# Patient Record
Sex: FEMALE | Race: WHITE | Employment: FULL TIME | URBAN - METROPOLITAN AREA
[De-identification: names, ages, dates, MRNs, and addresses within clinical notes are randomized per-mention and may not be internally consistent; named-entity substitution may affect disease eponyms.]

---

## 2017-10-25 PROBLEM — Q62.5 DUPLICATED RIGHT RENAL COLLECTING SYSTEM: Status: ACTIVE | Noted: 2017-10-25

## 2017-10-25 PROBLEM — N20.0 KIDNEY STONES: Status: ACTIVE | Noted: 2017-10-25

## 2017-10-25 PROBLEM — N20.0 RIGHT KIDNEY STONE: Status: ACTIVE | Noted: 2017-10-25

## 2017-12-30 PROCEDURE — 82436 ASSAY OF URINE CHLORIDE: CPT | Performed by: UROLOGY

## 2017-12-30 PROCEDURE — 82507 ASSAY OF CITRATE: CPT | Performed by: UROLOGY

## 2017-12-30 PROCEDURE — 84392 ASSAY OF URINE SULFATE: CPT | Performed by: UROLOGY

## 2017-12-30 PROCEDURE — 82340 ASSAY OF CALCIUM IN URINE: CPT | Performed by: UROLOGY

## 2017-12-30 PROCEDURE — 83945 ASSAY OF OXALATE: CPT | Performed by: UROLOGY

## 2018-11-19 PROCEDURE — 82436 ASSAY OF URINE CHLORIDE: CPT | Performed by: OBSTETRICS & GYNECOLOGY

## 2018-11-19 PROCEDURE — 82340 ASSAY OF CALCIUM IN URINE: CPT | Performed by: OBSTETRICS & GYNECOLOGY

## 2018-11-19 PROCEDURE — 82507 ASSAY OF CITRATE: CPT | Performed by: OBSTETRICS & GYNECOLOGY

## 2018-11-19 PROCEDURE — 84392 ASSAY OF URINE SULFATE: CPT | Performed by: OBSTETRICS & GYNECOLOGY

## 2018-11-19 PROCEDURE — 83945 ASSAY OF OXALATE: CPT | Performed by: OBSTETRICS & GYNECOLOGY

## 2019-12-07 ENCOUNTER — HOSPITAL ENCOUNTER (EMERGENCY)
Facility: HOSPITAL | Age: 64
Discharge: LEFT BEFORE DISCHARGE | End: 2019-12-07
Attending: EMERGENCY MEDICINE
Payer: COMMERCIAL

## 2019-12-07 ENCOUNTER — HOSPITAL ENCOUNTER (EMERGENCY)
Facility: HOSPITAL | Age: 64
Discharge: LEFT BEFORE DISCHARGE | End: 2019-12-07
Payer: COMMERCIAL

## 2019-12-07 NOTE — ED NOTES
Entered room. Refused to change. States does not want to be examined and have vital signs taken. Reports has had a bad experience in the past.   will see her own physician and will take pain medications in the meantime.

## 2020-10-21 ENCOUNTER — ORDER TRANSCRIPTION (OUTPATIENT)
Dept: ADMINISTRATIVE | Facility: HOSPITAL | Age: 65
End: 2020-10-21

## 2020-10-21 DIAGNOSIS — Z11.59 ENCOUNTER FOR SCREENING FOR OTHER VIRAL DISEASES: ICD-10-CM

## 2020-10-21 DIAGNOSIS — Z01.818 PREOP EXAMINATION: Primary | ICD-10-CM

## 2020-10-27 ENCOUNTER — LAB ENCOUNTER (OUTPATIENT)
Dept: LAB | Facility: HOSPITAL | Age: 65
End: 2020-10-27
Attending: OTOLARYNGOLOGY
Payer: COMMERCIAL

## 2020-10-27 DIAGNOSIS — Z01.812 PRE-PROCEDURE LAB EXAM: Primary | ICD-10-CM

## 2020-10-30 ENCOUNTER — HOSPITAL ENCOUNTER (OUTPATIENT)
Dept: GENERAL RADIOLOGY | Facility: HOSPITAL | Age: 65
Discharge: HOME | End: 2020-10-30
Attending: OTOLARYNGOLOGY
Payer: COMMERCIAL

## 2020-10-30 DIAGNOSIS — R07.0 THROAT DISCOMFORT: ICD-10-CM

## 2020-10-30 PROCEDURE — 74221 X-RAY XM ESOPHAGUS 2CNTRST: CPT | Performed by: OTOLARYNGOLOGY

## 2021-03-05 ENCOUNTER — ORDER TRANSCRIPTION (OUTPATIENT)
Dept: PHYSICAL THERAPY | Facility: HOSPITAL | Age: 66
End: 2021-03-05

## 2021-03-05 DIAGNOSIS — R13.10 DYSPHAGIA, UNSPECIFIED TYPE: Primary | ICD-10-CM

## 2021-04-02 ENCOUNTER — TELEPHONE (OUTPATIENT)
Dept: PHYSICAL THERAPY | Facility: HOSPITAL | Age: 66
End: 2021-04-02

## 2021-04-05 ENCOUNTER — OFFICE VISIT (OUTPATIENT)
Dept: SPEECH THERAPY | Facility: HOSPITAL | Age: 66
End: 2021-04-05
Attending: INTERNAL MEDICINE
Payer: COMMERCIAL

## 2021-04-05 DIAGNOSIS — R13.10 DYSPHAGIA, UNSPECIFIED TYPE: ICD-10-CM

## 2021-04-05 PROCEDURE — 92610 EVALUATE SWALLOWING FUNCTION: CPT

## 2021-04-05 NOTE — PROGRESS NOTES
ADULT SWALLOWING EVALUATION:   Referring Physician: Dr. Reza Ventura  Diagnosis: Dysphagia, unspecified type (R13.10) Date of Service: 4/5/2021   Clinical swallow evaluation completed per MD order.       PATIENT SUMMARY   Ian Ruggiero is a 73year old fema 0  Meth-Hyo-M Bl-Na Phos-Ph Sal (URIBEL) 118 MG Oral Cap, TK ONE C PO  TID, Disp: , Rfl: 0  tamsulosin HCl 0.4 MG Oral Cap, TK ONE C PO  QHS, Disp: , Rfl: 0  LORazepam 0.5 MG Oral Tab, TK 1 T PO BID PRN, Disp: , Rfl: 0  FLUoxetine HCl 10 MG Oral Cap, TK 1 to suspected pharyngeal residue. Clinical s/s of penetration/aspiration (ie: wet vocal quality, throat-clear/cough) were not observed under parameters of assessment.   Patient would benefit from skilled dysphagia therapy to address education of swallow mec in 6 visits)   Long Term:   LTG 1: Patient will report improved swallow function for safest/least restrictive means of nutrition/hydration (6 months).                     Short Term:   STG 1: Patient will verbalize understanding of aspiration precautions an

## 2021-04-16 ENCOUNTER — OFFICE VISIT (OUTPATIENT)
Dept: SPEECH THERAPY | Facility: HOSPITAL | Age: 66
End: 2021-04-16
Attending: INTERNAL MEDICINE
Payer: COMMERCIAL

## 2021-04-16 DIAGNOSIS — R13.10 DYSPHAGIA, UNSPECIFIED TYPE: ICD-10-CM

## 2021-04-16 PROCEDURE — 92526 ORAL FUNCTION THERAPY: CPT

## 2021-04-16 NOTE — PROGRESS NOTES
Treatment #2 (PPO; POC thru 7/4/21)   Treatment Time: 60 minutes  Precautions: aspiraiton     Charges: 1 billed ([id number])  Pain: 0/10      Diagnosis: Dysphagia, unspecified type (R13.10)            Subjective: Patient arrived to session on time.   She partici breathing.  HEP assigned to complete swallow exercises and effortful swallows during all consumption.

## 2021-04-20 ENCOUNTER — TELEPHONE (OUTPATIENT)
Dept: PHYSICAL THERAPY | Facility: HOSPITAL | Age: 66
End: 2021-04-20

## 2021-04-23 ENCOUNTER — APPOINTMENT (OUTPATIENT)
Dept: SPEECH THERAPY | Facility: HOSPITAL | Age: 66
End: 2021-04-23
Attending: INTERNAL MEDICINE
Payer: COMMERCIAL

## 2021-04-30 ENCOUNTER — APPOINTMENT (OUTPATIENT)
Dept: SPEECH THERAPY | Facility: HOSPITAL | Age: 66
End: 2021-04-30
Attending: INTERNAL MEDICINE
Payer: COMMERCIAL

## 2021-05-07 ENCOUNTER — APPOINTMENT (OUTPATIENT)
Dept: SPEECH THERAPY | Facility: HOSPITAL | Age: 66
End: 2021-05-07
Attending: INTERNAL MEDICINE
Payer: COMMERCIAL

## 2021-05-14 ENCOUNTER — APPOINTMENT (OUTPATIENT)
Dept: SPEECH THERAPY | Facility: HOSPITAL | Age: 66
End: 2021-05-14
Attending: INTERNAL MEDICINE
Payer: COMMERCIAL

## 2022-02-24 ENCOUNTER — OFFICE VISIT (OUTPATIENT)
Dept: OBGYN CLINIC | Facility: CLINIC | Age: 67
End: 2022-02-24
Payer: COMMERCIAL

## 2022-02-24 VITALS
HEART RATE: 58 BPM | DIASTOLIC BLOOD PRESSURE: 72 MMHG | WEIGHT: 119.25 LBS | SYSTOLIC BLOOD PRESSURE: 104 MMHG | BODY MASS INDEX: 23.41 KG/M2 | HEIGHT: 60 IN

## 2022-02-24 DIAGNOSIS — Z12.39 BREAST CANCER SCREENING OTHER THAN MAMMOGRAM: ICD-10-CM

## 2022-02-24 DIAGNOSIS — R92.2 DENSE BREAST TISSUE: ICD-10-CM

## 2022-02-24 DIAGNOSIS — Z12.31 ENCOUNTER FOR SCREENING MAMMOGRAM FOR BREAST CANCER: ICD-10-CM

## 2022-02-24 DIAGNOSIS — Z01.419 ENCOUNTER FOR WELL WOMAN EXAM WITH ROUTINE GYNECOLOGICAL EXAM: Primary | ICD-10-CM

## 2022-02-24 DIAGNOSIS — Z12.4 SCREENING FOR CERVICAL CANCER: ICD-10-CM

## 2022-02-24 PROCEDURE — 88175 CYTOPATH C/V AUTO FLUID REDO: CPT | Performed by: OBSTETRICS & GYNECOLOGY

## 2022-02-24 PROCEDURE — 3074F SYST BP LT 130 MM HG: CPT | Performed by: OBSTETRICS & GYNECOLOGY

## 2022-02-24 PROCEDURE — 3008F BODY MASS INDEX DOCD: CPT | Performed by: OBSTETRICS & GYNECOLOGY

## 2022-02-24 PROCEDURE — 99387 INIT PM E/M NEW PAT 65+ YRS: CPT | Performed by: OBSTETRICS & GYNECOLOGY

## 2022-02-24 PROCEDURE — 3078F DIAST BP <80 MM HG: CPT | Performed by: OBSTETRICS & GYNECOLOGY

## 2022-02-24 PROCEDURE — 87624 HPV HI-RISK TYP POOLED RSLT: CPT | Performed by: OBSTETRICS & GYNECOLOGY

## 2022-02-24 RX ORDER — BIOTIN 10000 MCG
1 CAPSULE ORAL DAILY
COMMUNITY

## 2022-02-25 LAB — HPV I/H RISK 1 DNA SPEC QL NAA+PROBE: NEGATIVE

## 2022-03-08 ENCOUNTER — TELEPHONE (OUTPATIENT)
Dept: OBGYN CLINIC | Facility: CLINIC | Age: 67
End: 2022-03-08

## 2022-03-08 NOTE — TELEPHONE ENCOUNTER
Patient advised a medical review stating need for exam from physician is needed to be sent to [facility] for approval

## 2022-03-09 NOTE — TELEPHONE ENCOUNTER
Contacted patient. She is calling regarding authorization needed for MBI. She has had this ordered as an add'l screening. Advised patient because this is a new test, it will not be authorized by insurance. Recommended that she complete whole breast ultrasound as this is covered by insurance and also considered an appropriate add'l screening. Patient states understanding and agrees with plan.

## 2022-03-17 ENCOUNTER — TELEPHONE (OUTPATIENT)
Dept: OBGYN CLINIC | Facility: CLINIC | Age: 67
End: 2022-03-17

## 2022-03-17 NOTE — TELEPHONE ENCOUNTER
Needs ultrasound and mammogram orders put in for both breasts and faxed to [facility].  See TE Below     Thank you

## 2022-03-17 NOTE — TELEPHONE ENCOUNTER
Left message on voicemail for patient to return phone call  Need to verify the order they are looking for   Detailed message left notifying patient to call back to verify the test she is having done

## 2022-03-18 NOTE — TELEPHONE ENCOUNTER
Received fax from [facility] asking to update diagnosis code for the breast ultrasound to Z12.39 and R92.2. Diagnosis code updated and faxed to [facility].

## 2022-04-18 ENCOUNTER — TELEPHONE (OUTPATIENT)
Dept: OBGYN CLINIC | Facility: CLINIC | Age: 67
End: 2022-04-18

## 2022-04-18 NOTE — TELEPHONE ENCOUNTER
[facility] Results   Placed in Dr. Amado jacobson in [facility] AT [facility]

## 2022-04-19 NOTE — TELEPHONE ENCOUNTER
Records reviewed:    Mammogram and whole breast ultrasound 4/18/22:  BI-RADS 2: benign findings. Heterogenously dense breast tissue. Repeat screening in 1 year.     Francisco Isaacs MD  [facility]  4/19/2022 12:40 PM

## 2022-07-28 ENCOUNTER — HOSPITAL ENCOUNTER (OUTPATIENT)
Dept: MRI IMAGING | Facility: HOSPITAL | Age: 67
Discharge: HOME | End: 2022-07-28
Payer: COMMERCIAL

## 2022-07-28 DIAGNOSIS — H90.42 SENSORINEURAL HEARING LOSS, UNILATERAL, LEFT EAR, WITH UNRESTRICTED HEARING ON THE CONTRALATERAL SIDE: ICD-10-CM

## 2022-07-28 PROCEDURE — A9575 INJ GADOTERATE MEGLUMI 0.1ML: HCPCS

## 2022-07-28 PROCEDURE — 70553 MRI BRAIN STEM W/O & W/DYE: CPT

## 2023-02-07 ENCOUNTER — TELEPHONE (OUTPATIENT)
Dept: OBGYN CLINIC | Facility: CLINIC | Age: 68
End: 2023-02-07

## 2023-02-07 NOTE — TELEPHONE ENCOUNTER
Pt calling states she needs vaginal cream for possible intercourse. Says she called yesterday but no documentation sent back. Pt upset about that.

## 2023-02-07 NOTE — TELEPHONE ENCOUNTER
GetFresh message sent. Contacted patient. Provided information and questions answered. Patient states understanding.

## 2023-02-07 NOTE — TELEPHONE ENCOUNTER
Patient has not been sexually active in about 4 years. She has a boyfriend now and may become sexually active. She has some day to day vaginal dryness and is concerned about having intercourse. She wants to know what we recommend for lubrication during intercourse.

## 2023-02-20 ENCOUNTER — TELEPHONE (OUTPATIENT)
Dept: OBGYN CLINIC | Facility: CLINIC | Age: 68
End: 2023-02-20

## 2023-02-20 NOTE — TELEPHONE ENCOUNTER
Patient has burning sensation on vulva. No vaginal discharge or itching. Patient has this after activity with current partner. Advised it sounds like irritation more than an infection. Advised an appointment would be needed for eval of the problem. Patient verbalized understanding and agrees to plan. Will be seen Th[facility]ie. In [place], by Dr. Tera Ferguson.

## 2023-03-15 ENCOUNTER — OFFICE VISIT (OUTPATIENT)
Dept: OBGYN CLINIC | Facility: CLINIC | Age: 68
End: 2023-03-15
Payer: COMMERCIAL

## 2023-03-15 ENCOUNTER — TELEPHONE (OUTPATIENT)
Dept: OBGYN CLINIC | Facility: CLINIC | Age: 68
End: 2023-03-15

## 2023-03-15 VITALS
HEIGHT: 60 IN | DIASTOLIC BLOOD PRESSURE: 60 MMHG | BODY MASS INDEX: 22.06 KG/M2 | SYSTOLIC BLOOD PRESSURE: 90 MMHG | HEART RATE: 70 BPM | WEIGHT: 112.38 LBS

## 2023-03-15 DIAGNOSIS — Z01.419 ENCOUNTER FOR WELL WOMAN EXAM WITH ROUTINE GYNECOLOGICAL EXAM: Primary | ICD-10-CM

## 2023-03-15 DIAGNOSIS — Z11.3 ROUTINE SCREENING FOR STI (SEXUALLY TRANSMITTED INFECTION): ICD-10-CM

## 2023-03-15 DIAGNOSIS — R92.2 DENSE BREAST TISSUE ON MAMMOGRAM: ICD-10-CM

## 2023-03-15 DIAGNOSIS — Z91.89 AT RISK FOR LOSS OF BONE DENSITY: ICD-10-CM

## 2023-03-15 DIAGNOSIS — Z12.31 ENCOUNTER FOR SCREENING MAMMOGRAM FOR MALIGNANT NEOPLASM OF BREAST: ICD-10-CM

## 2023-03-15 PROCEDURE — 99397 PER PM REEVAL EST PAT 65+ YR: CPT | Performed by: OBSTETRICS & GYNECOLOGY

## 2023-03-15 PROCEDURE — 3074F SYST BP LT 130 MM HG: CPT | Performed by: OBSTETRICS & GYNECOLOGY

## 2023-03-15 PROCEDURE — 87591 N.GONORRHOEAE DNA AMP PROB: CPT | Performed by: OBSTETRICS & GYNECOLOGY

## 2023-03-15 PROCEDURE — 87491 CHLMYD TRACH DNA AMP PROBE: CPT | Performed by: OBSTETRICS & GYNECOLOGY

## 2023-03-15 PROCEDURE — 3078F DIAST BP <80 MM HG: CPT | Performed by: OBSTETRICS & GYNECOLOGY

## 2023-03-15 PROCEDURE — 3008F BODY MASS INDEX DOCD: CPT | Performed by: OBSTETRICS & GYNECOLOGY

## 2023-03-15 RX ORDER — AMOXICILLIN AND CLAVULANATE POTASSIUM 875; 125 MG/1; MG/1
1 TABLET, FILM COATED ORAL 2 TIMES DAILY
COMMUNITY
Start: 2023-03-08

## 2023-03-15 RX ORDER — CEFUROXIME AXETIL 500 MG/1
500 TABLET ORAL 2 TIMES DAILY
COMMUNITY
Start: 2023-03-08

## 2023-03-15 RX ORDER — POLYETHYLENE GLYCOL 3350, SODIUM SULFATE ANHYDROUS, SODIUM BICARBONATE, SODIUM CHLORIDE, POTASSIUM CHLORIDE 236; 22.74; 6.74; 5.86; 2.97 G/4L; G/4L; G/4L; G/4L; G/4L
1 POWDER, FOR SOLUTION ORAL AS DIRECTED
COMMUNITY
Start: 2022-12-20

## 2023-03-15 RX ORDER — PREDNISONE 20 MG/1
40 TABLET ORAL DAILY
COMMUNITY
Start: 2023-03-08

## 2023-03-15 RX ORDER — FLUTICASONE PROPIONATE 50 MCG
1 SPRAY, SUSPENSION (ML) NASAL DAILY
COMMUNITY
Start: 2023-03-08

## 2023-03-15 NOTE — TELEPHONE ENCOUNTER
[facility] called and does not have the hemorrhoid cream in stock. Pharmacy has preparation H. They only have over the counter medications for this. Would you like patient to try a different pharmacy, or is preparation H ok?       Prudence Barber [phone]

## 2023-03-16 LAB
C TRACH DNA SPEC QL NAA+PROBE: NEGATIVE
N GONORRHOEA DNA SPEC QL NAA+PROBE: NEGATIVE

## 2023-03-17 NOTE — TELEPHONE ENCOUNTER
Called pharmacy and notified them that preparation H with phenylephrine is ok to use. They will assist patient in finding the right formulation.

## 2023-04-01 ENCOUNTER — HOSPITAL ENCOUNTER (OUTPATIENT)
Dept: BONE DENSITY | Age: 68
Discharge: HOME | End: 2023-04-01
Attending: OBSTETRICS & GYNECOLOGY
Payer: COMMERCIAL

## 2023-04-01 DIAGNOSIS — Z91.89 AT RISK FOR LOSS OF BONE DENSITY: ICD-10-CM

## 2023-04-01 PROCEDURE — 77080 DXA BONE DENSITY AXIAL: CPT | Performed by: OBSTETRICS & GYNECOLOGY

## 2023-05-11 ENCOUNTER — TELEPHONE (OUTPATIENT)
Dept: OBGYN CLINIC | Facility: CLINIC | Age: 68
End: 2023-05-11

## 2023-05-11 NOTE — TELEPHONE ENCOUNTER
Patient calling for Dexa scan results. She would like results faxed to Dr Lawyer Soler after phone call. [address] to [facility].

## 2023-05-24 ENCOUNTER — TELEPHONE (OUTPATIENT)
Dept: OBGYN CLINIC | Facility: CLINIC | Age: 68
End: 2023-05-24

## 2023-05-24 NOTE — TELEPHONE ENCOUNTER
I would recommend wearing underwear to avoid further irritation. Can use olive oil or coconut oil instead of vaseline. Mostly will just take time to heal. To help avoid the burning from urine, she can try urinating into a cup/any container to prevent any dripping onto the cut.

## 2023-05-24 NOTE — TELEPHONE ENCOUNTER
Patient states has she has started to have intercourse again and notes a small \"scratch\" on her vaginal area from her nail. Has been there for 4 days and is not healing. Has tried using Vaseline but is not working. Has also tried not wearing underwear but would like recommendations on what she can put on it to help it heal faster. States it burns when she urinates because the urine touches the scratch.

## 2023-05-25 NOTE — TELEPHONE ENCOUNTER
Contacted patient. Discussed recommendations given by Dr. Amy Rizvi. Questions answered and patient states understanding. Also provided mychart message with vaginal moisturizer options. Appt scheduled for next week so that patient has appt if symptoms do not improve with recommendations given by Dr. Corado .

## 2023-06-01 ENCOUNTER — OFFICE VISIT (OUTPATIENT)
Dept: OBGYN CLINIC | Facility: CLINIC | Age: 68
End: 2023-06-01
Payer: COMMERCIAL

## 2023-06-01 VITALS
HEIGHT: 60 IN | WEIGHT: 114 LBS | BODY MASS INDEX: 22.38 KG/M2 | HEART RATE: 67 BPM | SYSTOLIC BLOOD PRESSURE: 116 MMHG | DIASTOLIC BLOOD PRESSURE: 60 MMHG

## 2023-06-01 DIAGNOSIS — N95.8 GENITOURINARY SYNDROME OF MENOPAUSE: Primary | ICD-10-CM

## 2023-06-01 PROCEDURE — 99213 OFFICE O/P EST LOW 20 MIN: CPT | Performed by: OBSTETRICS & GYNECOLOGY

## 2023-06-01 PROCEDURE — 3078F DIAST BP <80 MM HG: CPT | Performed by: OBSTETRICS & GYNECOLOGY

## 2023-06-01 PROCEDURE — 3008F BODY MASS INDEX DOCD: CPT | Performed by: OBSTETRICS & GYNECOLOGY

## 2023-06-01 PROCEDURE — 3074F SYST BP LT 130 MM HG: CPT | Performed by: OBSTETRICS & GYNECOLOGY

## 2023-06-05 ENCOUNTER — TELEPHONE (OUTPATIENT)
Dept: OBGYN CLINIC | Facility: CLINIC | Age: 68
End: 2023-06-05

## 2023-06-05 NOTE — TELEPHONE ENCOUNTER
Received screening mammogram results from [facility]. Health Maintenance updated to show completion date of 06/05/23 and next date due. Report placed in Dr. Avina Seat in [place] for review.

## 2023-06-09 NOTE — TELEPHONE ENCOUNTER
Mammogram BIRADS 2. Dense breast tissue, can consider whole breast US as supplement to mammogram next year.

## 2023-06-09 NOTE — TELEPHONE ENCOUNTER
Patient notified of mammogram result and recommendation for whole breast ultrasound. Patient states she completed the breast US when she had her mammogram done. Breast US result is available in Care Everywhere.

## 2023-10-16 ENCOUNTER — OFFICE VISIT (OUTPATIENT)
Facility: LOCATION | Age: 68
End: 2023-10-16
Payer: COMMERCIAL

## 2023-10-16 DIAGNOSIS — H81.02 MENIERE DISEASE, LEFT: Primary | ICD-10-CM

## 2023-10-16 DIAGNOSIS — H93.13 TINNITUS OF BOTH EARS: ICD-10-CM

## 2023-10-16 DIAGNOSIS — H90.3 ASYMMETRIC SNHL (SENSORINEURAL HEARING LOSS): Primary | ICD-10-CM

## 2023-10-16 PROCEDURE — 92571 FILTERED SPEECH TEST: CPT | Performed by: AUDIOLOGIST-HEARING AID FITTER

## 2023-10-16 PROCEDURE — 99204 OFFICE O/P NEW MOD 45 MIN: CPT | Performed by: OTOLARYNGOLOGY

## 2023-10-16 PROCEDURE — 92567 TYMPANOMETRY: CPT | Performed by: AUDIOLOGIST-HEARING AID FITTER

## 2023-10-16 PROCEDURE — 92557 COMPREHENSIVE HEARING TEST: CPT | Performed by: AUDIOLOGIST-HEARING AID FITTER

## 2023-10-16 NOTE — PROGRESS NOTES
Shanell Stone was seen for an audiometric evaluation today. Referred back to physician.     Hernandez Umanzor

## 2024-06-07 ENCOUNTER — TELEPHONE (OUTPATIENT)
Dept: OBGYN CLINIC | Facility: CLINIC | Age: 69
End: 2024-06-07

## 2024-06-08 PROBLEM — R92.333 HETEROGENEOUSLY DENSE TISSUE OF BOTH BREASTS ON MAMMOGRAPHY: Status: ACTIVE | Noted: 2024-06-08

## 2024-06-12 ENCOUNTER — OFFICE VISIT (OUTPATIENT)
Dept: FAMILY MEDICINE CLINIC | Facility: CLINIC | Age: 69
End: 2024-06-12
Payer: COMMERCIAL

## 2024-06-12 VITALS
TEMPERATURE: 98 F | WEIGHT: 116 LBS | BODY MASS INDEX: 21.9 KG/M2 | HEIGHT: 61 IN | OXYGEN SATURATION: 97 % | RESPIRATION RATE: 18 BRPM | SYSTOLIC BLOOD PRESSURE: 102 MMHG | DIASTOLIC BLOOD PRESSURE: 64 MMHG | HEART RATE: 80 BPM

## 2024-06-12 DIAGNOSIS — J02.9 SORE THROAT: Primary | ICD-10-CM

## 2024-06-12 DIAGNOSIS — R05.9 COUGH IN ADULT: ICD-10-CM

## 2024-06-12 DIAGNOSIS — J04.0 LARYNGITIS: ICD-10-CM

## 2024-06-12 LAB
CONTROL LINE PRESENT WITH A CLEAR BACKGROUND (YES/NO): YES YES/NO
KIT LOT #: NORMAL NUMERIC

## 2024-06-12 PROCEDURE — 99202 OFFICE O/P NEW SF 15 MIN: CPT | Performed by: NURSE PRACTITIONER

## 2024-06-12 PROCEDURE — 3078F DIAST BP <80 MM HG: CPT | Performed by: NURSE PRACTITIONER

## 2024-06-12 PROCEDURE — 87637 SARSCOV2&INF A&B&RSV AMP PRB: CPT | Performed by: NURSE PRACTITIONER

## 2024-06-12 PROCEDURE — 87880 STREP A ASSAY W/OPTIC: CPT | Performed by: NURSE PRACTITIONER

## 2024-06-12 PROCEDURE — 3008F BODY MASS INDEX DOCD: CPT | Performed by: NURSE PRACTITIONER

## 2024-06-12 PROCEDURE — 3074F SYST BP LT 130 MM HG: CPT | Performed by: NURSE PRACTITIONER

## 2024-06-12 RX ORDER — CLARITHROMYCIN 500 MG/1
500 TABLET, COATED ORAL EVERY 12 HOURS
COMMUNITY
Start: 2024-06-10

## 2024-06-12 RX ORDER — BENZONATATE 200 MG/1
200 CAPSULE ORAL 3 TIMES DAILY PRN
Qty: 30 CAPSULE | Refills: 0 | Status: SHIPPED | OUTPATIENT
Start: 2024-06-12 | End: 2024-06-22

## 2024-06-12 RX ORDER — PREDNISONE 20 MG/1
40 TABLET ORAL DAILY
Qty: 10 TABLET | Refills: 0 | Status: SHIPPED | OUTPATIENT
Start: 2024-06-12 | End: 2024-06-17

## 2024-06-12 NOTE — PROGRESS NOTES
HPI:   Mady Mauro is a 69 year old female who presents with ill symptoms for  1  weeks. Patient reports sore throat, dry cough, fever at start of illness, sinus pressure, headache and right ear pressure. She saw her PCP two days ago and was treated with Clarithromycin which she did not start as she is concerned she may have RSV . Has tried nasal spray and neti pot with mild intermittent relief. No known contacts are sick.    Current Outpatient Medications   Medication Sig Dispense Refill    Biotin 10 MG Oral Cap Take 1 capsule by mouth daily.      Ergocalciferol (VITAMIN D OR) Take by mouth.      Multiple Vitamins-Minerals (BIOTIN PLUS/CALCIUM/VIT D3) Oral Tab Take by mouth.      clarithromycin 500 MG Oral Tab Take 1 tablet (500 mg total) by mouth Q12H. (Patient not taking: Reported on 6/12/2024)      fluticasone propionate 50 MCG/ACT Nasal Suspension 1 spray by Nasal route daily. (Patient not taking: Reported on 6/12/2024)      hydrocortisone 2.5 % External Ointment Apply very small amount to affected area twice daily x 1 week (Patient not taking: Reported on 6/12/2024)      PEG 3350-KCl-NaBcb-NaCl-NaSulf (PEG-3350/ELECTROLYTES) 236 g Oral Recon Soln Take 1 tablet by mouth As Directed. (Patient not taking: Reported on 6/12/2024)      phenylephrine-min oil-manuelito 0.25-3-14-71.9 % Rectal Ointment Place 1 g rectally 2 (two) times daily as needed for Hemorrhoids. (Patient not taking: Reported on 6/12/2024) 28 g 0    escitalopram 10 MG Oral Tab Take 10 mg by mouth daily. (Patient not taking: Reported on 6/12/2024)      Hyoscyamine Sulfate (LEVSIN/SL) 0.125 MG Sublingual SL Tab Place 1 tablet (125 mcg total) under the tongue every 6 (six) hours as needed for Cramping. (Patient not taking: Reported on 6/12/2024) 60 tablet 1    lubiprostone (AMITIZA) 8 MCG Oral Cap Take 1 capsule (8 mcg total) by mouth 2 (two) times daily with meals. (Patient not taking: Reported on 6/12/2024) 60 capsule 1    diazepam 5 MG Oral Tab  TK 1 T PO Q 8 H PRN SPASM PAIN (Patient not taking: Reported on 2024)  0    Meth-Hyo-M Bl-Na Phos-Ph Sal (URIBEL) 118 MG Oral Cap TK ONE C PO  TID (Patient not taking: Reported on 2024)  0    tamsulosin HCl 0.4 MG Oral Cap TK ONE C PO  QHS (Patient not taking: Reported on 2024)  0    LORazepam 0.5 MG Oral Tab TK 1 T PO BID PRN  0    FLUoxetine HCl 10 MG Oral Cap TK 1 C PO D FOR 7 DOSES THEN TK 2 CS PO D (Patient not taking: Reported on 2024)  1    docusate sodium 100 MG Oral Cap Take 100 mg by mouth 2 (two) times daily.       No current facility-administered medications for this visit.      Past Medical History:    Hyperlipidemia    Kidney stones    UTI (urinary tract infection)      Past Surgical History:   Procedure Laterality Date          Other surgical history  10/22/2017     Cystoscopy, Rt RPG, Rt URS, Stone Manipulation, Rt Stent Placement- Dr. Bass    Other surgical history  10/26/2017    cysto, right eswl, stent usw dr bass    Other surgical history  10/30/2017    cysto stent removal dr bass      No family history on file.   Social History     Socioeconomic History    Marital status: Single   Tobacco Use    Smoking status: Former    Smokeless tobacco: Never   Vaping Use    Vaping status: Never Used   Substance and Sexual Activity    Alcohol use: Yes    Drug use: No    Sexual activity: Yes   Other Topics Concern    Caffeine Concern Yes    Exercise Yes    Seat Belt Yes     Social Determinants of Health     Financial Resource Strain: Low Risk  (3/1/2024)    Received from [facility]    Overall Financial Resource Strain (CARDIA)     Difficulty of Paying Living Expenses: Not hard at all   Food Insecurity: No Food Insecurity (3/1/2024)    Received from [facility]    Hunger Vital Sign     Worried About Running Out of Food in the Last Year: Never true     Ran Out of Food in the Last Year: Never true   Transportation Needs: No  Transportation Needs (3/1/2024)    Received from [facility]    PRAPARE - Transportation     Lack of Transportation (Medical): No     Lack of Transportation (Non-Medical): No    Received from [facility]    Social Connections   Housing Stability: Low Risk  (3/1/2024)    Received from [facility]    Housing Stability Vital Sign     Unable to Pay for Housing in the Last Year: No     Number of Places Lived in the Last Year: 1     In the last 12 months, was there a time when you did not have a steady place to sleep or slept in a shelter (including now)?: No         REVIEW OF SYSTEMS:   GENERAL: feels well otherwise, mild fatigue  HEENT: congested, as above in HPI  LUNGS: notes shortness of breath with exertion, dry cough worse at night, negative Covid at home test last night  CARDIOVASCULAR: denies chest pain on exertion  GI: no nausea or abdominal pain, appetite normal  NEURO: admits to headaches    EXAM:   /64   Pulse 80   Temp 98.1 °F (36.7 °C) (Temporal)   Resp 18   Ht 5' 1\" (1.549 m)   Wt 116 lb (52.6 kg)   SpO2 97%   BMI 21.92 kg/m²   GENERAL: well developed, well nourished,in no apparent distress, voice hoarse but well appearing  HEENT: atraumatic, normocephalic,ears clear bilaterally, nares with minimal mucus, throat reddened. Uvuvla midline.  No sinus tenderness with palpation.  NECK: supple,no adenopathy  LUNGS: clear to auscultation  CARDIO: RRR without murmur  Results for orders placed or performed in visit on 06/12/24   Strep A Assay W/Optic    Collection Time: 06/12/24 10:28 AM   Result Value Ref Range    Strep Grp A Screen neg Negative    Control Line Present with a clear background (yes/no) yes Yes/No    Kit Lot # [id number] Numeric    Kit Expiration Date 5-21-25 Date         ASSESSMENT AND PLAN:    PLAN:Mady was seen today for sore throat.    Diagnoses and all orders for this visit:    Sore  throat  -     Strep A Assay W/Optic  -     benzonatate 200 MG Oral Cap; Take 1 capsule (200 mg total) by mouth 3 (three) times daily as needed for cough.  -     SARS-CoV-2/Flu A and B/RSV by PCR ([facility])    Laryngitis  -     predniSONE 20 MG Oral Tab; Take 2 tablets (40 mg total) by mouth daily for 5 days.  -     SARS-CoV-2/Flu A and B/RSV by PCR ([facility])    Cough in adult  -     predniSONE 20 MG Oral Tab; Take 2 tablets (40 mg total) by mouth daily for 5 days.  -     benzonatate 200 MG Oral Cap; Take 1 capsule (200 mg total) by mouth 3 (three) times daily as needed for cough.  -     SARS-CoV-2/Flu A and B/RSV by PCR ([facility])      Negative rapid strep. Viral quad sent for reassurance. Meds as above and below. Self care discussed. Medication use and risk/benefit discussed. Patient is advised to follow up with PCP if not improving with treatment plan or seek immediate care if symptoms worsen.  The patient indicates understanding of these issues and agrees to the plan.  Patient Instructions   Please start Prednisone early in day starting today. This is for inflammation and will help decrease swelling.  You may take Benzonatate cough capsule at night time for soothing the throat and helping cough as needed. Take with a large glass of water.  You may take Clarithromycin (antibiotic) if you want, take as directed by your doctor.  May continue Neti-Pot once daily. Rest, hydrate well, salt water gargles.  Follow up with your doctor if symptoms persist. Seek emergency care if difficulty breathing or speaking.

## 2024-06-12 NOTE — PATIENT INSTRUCTIONS
Please start Prednisone early in day starting today. This is for inflammation and will help decrease swelling.  You may take Benzonatate cough capsule at night time for soothing the throat and helping cough as needed. Take with a large glass of water.  You may take Clarithromycin (antibiotic) if you want, take as directed by your doctor.  May continue Neti-Pot once daily. Rest, hydrate well, salt water gargles.  Follow up with your doctor if symptoms persist. Seek emergency care if difficulty breathing or speaking.

## 2024-06-13 LAB
FLUAV + FLUBV RNA SPEC NAA+PROBE: NOT DETECTED
FLUAV + FLUBV RNA SPEC NAA+PROBE: NOT DETECTED
RSV RNA SPEC NAA+PROBE: NOT DETECTED
SARS-COV-2 RNA RESP QL NAA+PROBE: NOT DETECTED

## 2024-06-18 ENCOUNTER — OFFICE VISIT (OUTPATIENT)
Dept: OBGYN CLINIC | Facility: CLINIC | Age: 69
End: 2024-06-18

## 2024-06-18 VITALS
SYSTOLIC BLOOD PRESSURE: 100 MMHG | HEART RATE: 84 BPM | BODY MASS INDEX: 22 KG/M2 | DIASTOLIC BLOOD PRESSURE: 62 MMHG | WEIGHT: 117.19 LBS

## 2024-06-18 DIAGNOSIS — Z01.419 WELL WOMAN EXAM WITH ROUTINE GYNECOLOGICAL EXAM: Primary | ICD-10-CM

## 2024-06-18 PROCEDURE — 3074F SYST BP LT 130 MM HG: CPT | Performed by: STUDENT IN AN ORGANIZED HEALTH CARE EDUCATION/TRAINING PROGRAM

## 2024-06-18 PROCEDURE — 3078F DIAST BP <80 MM HG: CPT | Performed by: STUDENT IN AN ORGANIZED HEALTH CARE EDUCATION/TRAINING PROGRAM

## 2024-06-18 PROCEDURE — 99397 PER PM REEVAL EST PAT 65+ YR: CPT | Performed by: STUDENT IN AN ORGANIZED HEALTH CARE EDUCATION/TRAINING PROGRAM

## 2024-06-18 NOTE — PROGRESS NOTES
Annual Exam (Ages 65+)    Subjective:    This is a 69 year old  presenting for routine annual exam     Denies postmenopausal bleeding    Reports vaginal dryness. Asks about using hyaluronic acid. Discouraged douching.     Is retiring in a matter of days!    Review of Systems   Constitutional: Negative.    HENT: Negative.    Respiratory: Negative.    Gastrointestinal: Negative.    Endocrine: Negative.    Genitourinary: as above    Musculoskeletal: Negative.    Skin: Negative.    Allergic/Immunologic: Negative.    Neurological: Negative.      Objective:    No Known Allergies  Past Medical History:    Hyperlipidemia    Kidney stones    UTI (urinary tract infection)       Current Outpatient Medications:     clarithromycin 500 MG Oral Tab, Take 1 tablet (500 mg total) by mouth Q12H., Disp: , Rfl:     benzonatate 200 MG Oral Cap, Take 1 capsule (200 mg total) by mouth 3 (three) times daily as needed for cough., Disp: 30 capsule, Rfl: 0    Biotin 10 MG Oral Cap, Take 1 capsule by mouth daily., Disp: , Rfl:     Ergocalciferol (VITAMIN D OR), Take by mouth., Disp: , Rfl:     fluticasone propionate 50 MCG/ACT Nasal Suspension, 1 spray by Nasal route daily. (Patient not taking: Reported on 2024), Disp: , Rfl:     hydrocortisone 2.5 % External Ointment, Apply very small amount to affected area twice daily x 1 week (Patient not taking: Reported on 2024), Disp: , Rfl:     PEG 3350-KCl-NaBcb-NaCl-NaSulf (PEG-3350/ELECTROLYTES) 236 g Oral Recon Soln, Take 1 tablet by mouth As Directed. (Patient not taking: Reported on 2024), Disp: , Rfl:     phenylephrine-min oil-manuelito 0.25-3-14-71.9 % Rectal Ointment, Place 1 g rectally 2 (two) times daily as needed for Hemorrhoids. (Patient not taking: Reported on 2024), Disp: 28 g, Rfl: 0    escitalopram 10 MG Oral Tab, Take 10 mg by mouth daily. (Patient not taking: Reported on 2024), Disp: , Rfl:     Hyoscyamine Sulfate (LEVSIN/SL) 0.125 MG Sublingual SL Tab,  Place 1 tablet (125 mcg total) under the tongue every 6 (six) hours as needed for Cramping. (Patient not taking: Reported on 2024), Disp: 60 tablet, Rfl: 1    lubiprostone (AMITIZA) 8 MCG Oral Cap, Take 1 capsule (8 mcg total) by mouth 2 (two) times daily with meals. (Patient not taking: Reported on 2024), Disp: 60 capsule, Rfl: 1    diazepam 5 MG Oral Tab, TK 1 T PO Q 8 H PRN SPASM PAIN (Patient not taking: Reported on 2024), Disp: , Rfl: 0    Meth-Hyo-M Bl-Na Phos-Ph Sal (URIBEL) 118 MG Oral Cap, TK ONE C PO  TID (Patient not taking: Reported on 2024), Disp: , Rfl: 0    tamsulosin HCl 0.4 MG Oral Cap, TK ONE C PO  QHS (Patient not taking: Reported on 2024), Disp: , Rfl: 0    LORazepam 0.5 MG Oral Tab, TK 1 T PO BID PRN (Patient not taking: Reported on 2024), Disp: , Rfl: 0    FLUoxetine HCl 10 MG Oral Cap, TK 1 C PO D FOR 7 DOSES THEN TK 2 CS PO D (Patient not taking: Reported on 2024), Disp: , Rfl: 1    docusate sodium 100 MG Oral Cap, Take 1 capsule (100 mg total) by mouth 2 (two) times daily. (Patient not taking: Reported on 2024), Disp: , Rfl:     Multiple Vitamins-Minerals (BIOTIN PLUS/CALCIUM/VIT D3) Oral Tab, Take by mouth. (Patient not taking: Reported on 2024), Disp: , Rfl:   Past Surgical History:   Procedure Laterality Date          Other surgical history  10/22/2017     Cystoscopy, Rt RPG, Rt URS, Stone Manipulation, Rt Stent Placement- Dr. Bass    Other surgical history  10/26/2017    cysto, right eswl, stent usw dr bass    Other surgical history  10/30/2017    cysto stent removal dr bass     History reviewed. No pertinent family history.   reports that she has quit smoking. She has never used smokeless tobacco. She reports current alcohol use. She reports that she does not use drugs.    Physical Exam     Vitals:    24 1351   BP: 100/62   Pulse: 84        Constitutional: She is oriented to person, place, and time. She appears  well-developed and well-nourished.   Cardiovascular: normal peripheral perfusion  Breasts: deferred given normal recent ultrasound and mammogram  Pulmonary/Chest: non labored breathing  Genitourinary: Normal appearing postmenopausal external genitalia without lesions. Vagina is hypo-estrogenic and without lesions. Normal appearing urethral meatus. Normal urethra without masses/tenderness/scarring. No bladder tenderness. Bartholin's gland normal to palpation. Normal appearing  cervix. Cervix is not friable and with normal appearing discharge. Uterus is 6 weeks size  and non tender. No cervical motion tenderness. Normal adnexa bilaterally without tenderness.   Neurological: She is alert and oriented to person, place, and time.     Assessment and Plan  This is a 69 year old  presenting for annual exam.     #Annual Exam  -Body mass index is 22.14 kg/m².   -Urinary incontinence screening mostly negative  -Pap smear UTD, pt elects to continue paps every 3 yrs   -Mammogram UTD    Nolberto Wilburn MD